# Patient Record
Sex: FEMALE | Race: OTHER | Employment: OTHER | URBAN - METROPOLITAN AREA
[De-identification: names, ages, dates, MRNs, and addresses within clinical notes are randomized per-mention and may not be internally consistent; named-entity substitution may affect disease eponyms.]

---

## 2017-12-27 ENCOUNTER — ESTABLISHED COMPREHENSIVE EXAM (OUTPATIENT)
Dept: URBAN - METROPOLITAN AREA CLINIC 35 | Facility: CLINIC | Age: 42
End: 2017-12-27

## 2017-12-27 DIAGNOSIS — H52.13: ICD-10-CM

## 2017-12-27 PROCEDURE — 92310-1 LEVEL 1 CONTACT LENS MANAGEMENT

## 2017-12-27 PROCEDURE — 92015 DETERMINE REFRACTIVE STATE: CPT

## 2017-12-27 PROCEDURE — 92014 COMPRE OPH EXAM EST PT 1/>: CPT

## 2017-12-27 ASSESSMENT — VISUAL ACUITY
OS_CC: 20/25
OS_SC: J3
OD_SC: 20/400
OD_CC: 20/20-2
OD_SC: J12
OS_SC: 20/400

## 2017-12-27 ASSESSMENT — TONOMETRY
OD_IOP_MMHG: 16
OS_IOP_MMHG: 16

## 2018-08-28 NOTE — PATIENT DISCUSSION
08/28/2018 (RETINA)- OCT/FA/ICG at the next visit. Recommended following up in 6 months for a comprehensive visit.

## 2019-04-02 ENCOUNTER — CONTACT LENS EXAM ESTABLISHED (OUTPATIENT)
Dept: URBAN - METROPOLITAN AREA CLINIC 35 | Facility: CLINIC | Age: 44
End: 2019-04-02

## 2019-04-02 DIAGNOSIS — Z46.0: ICD-10-CM

## 2019-04-02 DIAGNOSIS — H52.13: ICD-10-CM

## 2019-04-02 PROCEDURE — 92015 DETERMINE REFRACTIVE STATE: CPT

## 2019-04-02 PROCEDURE — 92310-1 LEVEL 1 CONTACT LENS MANAGEMENT

## 2019-04-02 PROCEDURE — 92014 COMPRE OPH EXAM EST PT 1/>: CPT

## 2019-04-02 ASSESSMENT — VISUAL ACUITY
OS_CC: 20/20 W CLS
OD_CC: 20/20 W CLS
OS_SC: 20/400-
OD_SC: CF 6FT

## 2019-04-02 ASSESSMENT — KERATOMETRY
OS_AXISANGLE_DEGREES: 13
OS_K2POWER_DIOPTERS: 44.75
OS_K1POWER_DIOPTERS: 44
OD_K1POWER_DIOPTERS: 43.25
OD_AXISANGLE2_DEGREES: 52
OD_K2POWER_DIOPTERS: 43.75
OD_AXISANGLE_DEGREES: 142
OS_AXISANGLE2_DEGREES: 103

## 2019-04-02 ASSESSMENT — TONOMETRY
OD_IOP_MMHG: 14
OS_IOP_MMHG: 14
